# Patient Record
Sex: FEMALE | Race: AMERICAN INDIAN OR ALASKA NATIVE
[De-identification: names, ages, dates, MRNs, and addresses within clinical notes are randomized per-mention and may not be internally consistent; named-entity substitution may affect disease eponyms.]

---

## 2021-07-10 ENCOUNTER — HOSPITAL ENCOUNTER (EMERGENCY)
Dept: HOSPITAL 5 - ED | Age: 31
Discharge: HOME | End: 2021-07-10
Payer: COMMERCIAL

## 2021-07-10 VITALS — SYSTOLIC BLOOD PRESSURE: 121 MMHG | DIASTOLIC BLOOD PRESSURE: 75 MMHG

## 2021-07-10 DIAGNOSIS — Z98.890: ICD-10-CM

## 2021-07-10 DIAGNOSIS — R07.9: ICD-10-CM

## 2021-07-10 DIAGNOSIS — Z79.899: ICD-10-CM

## 2021-07-10 DIAGNOSIS — M54.6: ICD-10-CM

## 2021-07-10 DIAGNOSIS — M41.9: Primary | ICD-10-CM

## 2021-07-10 PROCEDURE — 71046 X-RAY EXAM CHEST 2 VIEWS: CPT

## 2021-07-10 PROCEDURE — 93005 ELECTROCARDIOGRAM TRACING: CPT

## 2021-07-10 PROCEDURE — 99283 EMERGENCY DEPT VISIT LOW MDM: CPT

## 2021-07-10 NOTE — EMERGENCY DEPARTMENT REPORT
ED General Adult HPI





- General


Chief complaint: Chest Pain


Stated complaint: CHEST PAIN, LT ARM BACK PAIN


Time Seen by Provider: 07/10/21 12:19


Source: patient


Mode of arrival: Ambulatory


Limitations: No Limitations





- History of Present Illness


Initial comments: 





Patient is a 31-year-old female presents emergency room with complaints of chest

pain and upper back pain that began a week ago.  She states her pain is worse 

with movement.  She states her chest pain has resolved but she continues to have

upper back pain.  She denies any fall or injury.  She denies any fever, nausea, 

vomiting, diarrhea, diaphoresis, shortness of breath, pleuritic chest pain, leg 

swelling.  She denies any recent travel or recent surgery.  No past medical 

history.  No allergies to medications.  She states that she is currently on her 

menstrual cycle.  She states that she is a non-smoker.  She states that her only

family cardiac history is that her grandparents have heart disease at an older 

age.





- Related Data


                                  Previous Rx's











 Medication  Instructions  Recorded  Last Taken  Type


 


Naproxen 375 mg PO BID PRN #20 tablet 07/10/21 Unknown Rx


 


methOCARBAMOL [Robaxin TAB] 500 mg PO BID PRN #20 tab 07/10/21 Unknown Rx











                                    Allergies











Allergy/AdvReac Type Severity Reaction Status Date / Time


 


No Known Allergies Allergy   Unverified 07/10/21 12:08














ED Review of Systems


ROS: 


Stated complaint: CHEST PAIN, LT ARM BACK PAIN


Other details as noted in HPI





Comment: All other systems reviewed and negative





ED Past Medical Hx





- Past Medical History


Previous Medical History?: No





- Surgical History


Past Surgical History?: Yes


Additional Surgical History: left knee





- Medications


Home Medications: 


                                Home Medications











 Medication  Instructions  Recorded  Confirmed  Last Taken  Type


 


Naproxen 375 mg PO BID PRN #20 tablet 07/10/21  Unknown Rx


 


methOCARBAMOL [Robaxin TAB] 500 mg PO BID PRN #20 tab 07/10/21  Unknown Rx














ED Physical Exam





- General


Limitations: No Limitations


General appearance: alert, in no apparent distress





- Head


Head exam: Present: atraumatic, normocephalic





- Eye


Eye exam: Present: normal appearance





- ENT


ENT exam: Present: mucous membranes moist





- Neck


Neck exam: Present: normal inspection, full ROM.  Absent: tenderness, 

meningismus





- Respiratory


Respiratory exam: Present: normal lung sounds bilaterally, chest wall tenderness

(reproducible left anterior chest wall ttp, no crepitus, no deformities).  

Absent: respiratory distress, wheezes, rales, rhonchi, stridor, accessory muscle

use, decreased breath sounds, prolonged expiratory





- Cardiovascular


Cardiovascular Exam: Present: regular rate, normal rhythm, normal heart sounds. 

Absent: systolic murmur, diastolic murmur, rubs, gallop





- Back Exam


Back exam: Present: normal inspection, full ROM, paraspinal tenderness (left 

sided T-spine paraspinal muscular ttp, no midline C-spine, T-spine or L-spine 

ttp, no step offs, no deformities).  Absent: vertebral tenderness





- Neurological Exam


Neurological exam: Present: alert, oriented X3, normal gait





- Psychiatric


Psychiatric exam: Present: normal affect, normal mood





- Skin


Skin exam: Present: warm, dry, intact





ED Course


                                   Vital Signs











  07/10/21 07/10/21





  12:10 13:13


 


Temperature 98.2 F 


 


Pulse Rate 98 H 92 H


 


Respiratory 18 





Rate  


 


Blood Pressure 121/75 


 


O2 Sat by Pulse 100 100





Oximetry  














ED Medical Decision Making





- Lab Data








                                   Vital Signs











  07/10/21 07/10/21





  12:10 13:13


 


Temperature 98.2 F 


 


Pulse Rate 98 H 92 H


 


Respiratory 18 





Rate  


 


Blood Pressure 121/75 


 


O2 Sat by Pulse 100 100





Oximetry  














- EKG Data


EKG shows normal: sinus rhythm, axis, intervals, QRS complexes, ST-T waves


Rate: normal





- Radiology Data


Radiology results: report reviewed





Ordering Physician: JUSTINO SWANN  


Date of Service: 07/10/21  


Procedure(s): XR chest routine 2V  


Accession Number(s): [id number]  


 


cc: JUSTINO SWANN   


 


Fluoro Time In Minutes:   


 


CHEST 2 VIEWS   


 


 INDICATION / CLINICAL INFORMATION: Chest pain.  


 


 COMPARISON: None available.  


 


 FINDINGS:  


 


 SUPPORT DEVICES: None.  


 HEART / MEDIASTINUM: The heart size and pulmonary vasculature are normal. The 

aorta is normal in 


caliber.   


 LUNGS / PLEURA: There are nipple shadows overlying both lower lung zones on the

PA view. No 


significant parenchymal or pleural abnormality. No pneumothorax.   


 


 ADDITIONAL FINDINGS: There is mild thoracolumbar scoliosis.  


 


 IMPRESSION: No acute findings.  


 


 Signer Name: Leonel Zamora MD   


 Signed: 7/10/2021 12:58 PM  


 Workstation Name: [id number]   


 


 


Transcribed By: RT  


Dictated By: Leonel Zamora MD  


Electronically Authenticated By: Leonel Zamora MD    


Signed Date/Time: 07/10/21 1258                                


 


 


 


DD/DT: 07/10/21 1256                                                            

 


TD/TT:


Print





- Medical Decision Making





Patient is a 31-year-old female presents emergency room with complaints of chest

pain and upper back pain that began a week ago.  She states her pain is worse 

with movement.  She states her chest pain has resolved but she continues to have

upper back pain.  She denies any fall or injury.  She denies any fever, nausea, 

vomiting, diarrhea, diaphoresis, shortness of breath, pleuritic chest pain, leg 

swelling.  She denies any recent travel or recent surgery.  No past medical 

history.  No allergies to medications.  She states that she is currently on her 

menstrual cycle.  She states that she is a non-smoker.  She states that her only

family cardiac history is that her grandparents have heart disease at an older 

age.  Vitals are normal.  On exam:reproducible left anterior chest wall ttp, no 

crepitus, no deformities, left sided T-spine paraspinal muscular ttp, no midline

C-spine, T-spine or L-spine ttp, no step offs, no deformities.  Examination 

appears to be musculoskeletal in origin, she has had no trauma, could be related

to costochondritis versus muscle strain.  EKG is within normal limits.  Chest x-

ray  IMPRESSION: No acute findings.  PERC criteria negative for PE, PE unlikely.

 Patient given prescription for medication.  Advised patient Please take 

medication as prescribed.  Follow-up with a primary care doctor for 

reexamination.  Return to emergency room immediately for any new or worsening 

symptoms.  Discuss strict return precautions with patient.


Critical care attestation.: 


If time is entered above; I have spent that time in minutes in the direct care 

of this critically ill patient, excluding procedure time.








ED Disposition


Clinical Impression: 


 Upper back pain





Chest pain


Qualifiers:


 Chest pain type: unspecified Qualified Code(s): R07.9 - Chest pain, unspecified





Scoliosis of thoracolumbar spine


Qualifiers:


 Scoliosis type: unspecified scoliosis Qualified Code(s): M41.9 - Scoliosis, 

unspecified





Disposition: DC-01 TO HOME OR SELFCARE


Is pt being admited?: No


Does the pt Need Aspirin: No


Condition: Stable


Instructions:  Costochondritis, Easy-to-Read, Nonspecific Chest Pain, Adult


Additional Instructions: 


Please take medication as prescribed.  Follow-up with a primary care doctor for 

reexamination.  Return to emergency room immediately for any new or worsening 

symptoms.


Prescriptions: 


Naproxen 375 mg PO BID PRN #20 tablet


 PRN Reason: pain


methOCARBAMOL [Robaxin TAB] 500 mg PO BID PRN #20 tab


 PRN Reason: muscle spasm/pain


Referrals: 


PRIMARY CARE,MD [Primary Care Provider] - 2-3 Days


Time of Disposition: 13:06


Print Language: ENGLISH

## 2021-07-10 NOTE — XRAY REPORT
CHEST 2 VIEWS 



INDICATION / CLINICAL INFORMATION: Chest pain.



COMPARISON: None available.



FINDINGS:



SUPPORT DEVICES: None.

HEART / MEDIASTINUM: The heart size and pulmonary vasculature are normal. The aorta is normal in mikel
agus. 

LUNGS / PLEURA: There are nipple shadows overlying both lower lung zones on the PA view. No significa
nt parenchymal or pleural abnormality. No pneumothorax. 



ADDITIONAL FINDINGS: There is mild thoracolumbar scoliosis.



IMPRESSION: No acute findings.



Signer Name: Leonel Zamora MD 

Signed: 7/10/2021 12:58 PM

Workstation Name: [id number]

## 2021-07-12 NOTE — ELECTROCARDIOGRAPH REPORT
[facility]

                                       

Test Date:    2021-07-10               Test Time:    12:15:47

Pat Name:     MERLINE HEAD               Department:   

Patient ID:   [id number]          Room:          

Gender:       F                        Technician:   VIVIAN

:          1990               Requested By: KIKE STRATTON

Order Number: [id number]              Reading MD:   Emilie Boone

                                 Measurements

Intervals                              Axis          

Rate:         95                       P:            68

CA:           165                      QRS:          70

QRSD:         98                       T:            51

QT:           367                                    

QTc:          460                                    

                           Interpretive Statements

Sinus rhythm

No previous ECG available for comparison

Electronically Signed On 2021 17:52:41 EDT by Emilie Boone

## 2023-12-19 ENCOUNTER — OFFICE VISIT (OUTPATIENT)
Dept: URBAN - METROPOLITAN AREA CLINIC 118 | Facility: CLINIC | Age: 33
End: 2023-12-19

## 2024-01-31 ENCOUNTER — OFFICE VISIT (OUTPATIENT)
Dept: URBAN - METROPOLITAN AREA CLINIC 118 | Facility: CLINIC | Age: 34
End: 2024-01-31

## 2024-03-15 ENCOUNTER — OV NP (OUTPATIENT)
Dept: URBAN - METROPOLITAN AREA CLINIC 118 | Facility: CLINIC | Age: 34
End: 2024-03-15